# Patient Record
Sex: FEMALE | Race: WHITE
[De-identification: names, ages, dates, MRNs, and addresses within clinical notes are randomized per-mention and may not be internally consistent; named-entity substitution may affect disease eponyms.]

---

## 2018-03-13 ENCOUNTER — HOSPITAL ENCOUNTER (EMERGENCY)
Dept: HOSPITAL 63 - ER | Age: 48
Discharge: ANOTHER HOSPITAL | End: 2018-03-13
Payer: OTHER GOVERNMENT

## 2018-03-13 VITALS — BODY MASS INDEX: 30.61 KG/M2 | WEIGHT: 195 LBS | HEIGHT: 67 IN

## 2018-03-13 VITALS — DIASTOLIC BLOOD PRESSURE: 80 MMHG | SYSTOLIC BLOOD PRESSURE: 135 MMHG

## 2018-03-13 DIAGNOSIS — E87.6: ICD-10-CM

## 2018-03-13 DIAGNOSIS — B34.9: ICD-10-CM

## 2018-03-13 DIAGNOSIS — H53.8: ICD-10-CM

## 2018-03-13 DIAGNOSIS — M43.6: ICD-10-CM

## 2018-03-13 DIAGNOSIS — A08.4: ICD-10-CM

## 2018-03-13 DIAGNOSIS — G89.29: ICD-10-CM

## 2018-03-13 DIAGNOSIS — Z91.041: ICD-10-CM

## 2018-03-13 DIAGNOSIS — R51: Primary | ICD-10-CM

## 2018-03-13 DIAGNOSIS — E86.1: ICD-10-CM

## 2018-03-13 LAB
ALBUMIN SERPL-MCNC: 3.9 G/DL (ref 3.4–5)
ALBUMIN/GLOB SERPL: 0.8 {RATIO} (ref 1–1.7)
ALP SERPL-CCNC: 79 U/L (ref 46–116)
ALT SERPL-CCNC: 16 U/L (ref 14–59)
ANION GAP SERPL CALC-SCNC: 10 MMOL/L (ref 6–14)
APTT PPP: (no result) S
AST SERPL-CCNC: 12 U/L (ref 15–37)
BACTERIA #/AREA URNS HPF: 0 /HPF
BASOPHILS # BLD AUTO: 0 X10^3/UL (ref 0–0.2)
BASOPHILS NFR BLD: 1 % (ref 0–3)
BILIRUB SERPL-MCNC: 0.2 MG/DL (ref 0.2–1)
BILIRUB UR QL STRIP: (no result)
BUN/CREAT SERPL: 9 (ref 6–20)
CA-I SERPL ISE-MCNC: 9 MG/DL (ref 7–20)
CALCIUM SERPL-MCNC: 8.7 MG/DL (ref 8.5–10.1)
CHLORIDE SERPL-SCNC: 103 MMOL/L (ref 98–107)
CO2 SERPL-SCNC: 26 MMOL/L (ref 21–32)
CREAT SERPL-MCNC: 1 MG/DL (ref 0.6–1)
EOSINOPHIL NFR BLD: 0 % (ref 0–3)
EOSINOPHIL NFR BLD: 0 X10^3/UL (ref 0–0.7)
ERYTHROCYTE [DISTWIDTH] IN BLOOD BY AUTOMATED COUNT: 17 % (ref 11.5–14.5)
FIBRINOGEN PPP-MCNC: CLEAR MG/DL
GFR SERPLBLD BASED ON 1.73 SQ M-ARVRAT: 59.7 ML/MIN
GLOBULIN SER-MCNC: 4.6 G/DL (ref 2.2–3.8)
GLUCOSE SERPL-MCNC: 92 MG/DL (ref 70–99)
GLUCOSE UR STRIP-MCNC: (no result) MG/DL
HCT VFR BLD CALC: 39 % (ref 36–47)
HGB BLD-MCNC: 12.9 G/DL (ref 12–15.5)
INFLUENZA A PATIENT: NEGATIVE
INFLUENZA B PATIENT: NEGATIVE
LIPASE: 134 U/L (ref 73–393)
LYMPHOCYTES # BLD: 0.9 X10^3/UL (ref 1–4.8)
LYMPHOCYTES NFR BLD AUTO: 15 % (ref 24–48)
MCH RBC QN AUTO: 26 PG (ref 25–35)
MCHC RBC AUTO-ENTMCNC: 33 G/DL (ref 31–37)
MCV RBC AUTO: 79 FL (ref 79–100)
MONO #: 0.6 X10^3/UL (ref 0–1.1)
MONOCYTES NFR BLD: 11 % (ref 0–9)
NEUT #: 4.2 X10^3UL (ref 1.8–7.7)
NEUTROPHILS NFR BLD AUTO: 73 % (ref 31–73)
NITRITE UR QL STRIP: (no result)
PLATELET # BLD AUTO: 291 X10^3/UL (ref 140–400)
POTASSIUM SERPL-SCNC: 3.2 MMOL/L (ref 3.5–5.1)
PROT SERPL-MCNC: 8.5 G/DL (ref 6.4–8.2)
RBC # BLD AUTO: 4.94 X10^6/UL (ref 3.5–5.4)
RBC #/AREA URNS HPF: (no result) /HPF (ref 0–2)
SODIUM SERPL-SCNC: 139 MMOL/L (ref 136–145)
SP GR UR STRIP: <=1.005
SQUAMOUS #/AREA URNS LPF: (no result) /LPF
UROBILINOGEN UR-MCNC: 0.2 MG/DL
WBC # BLD AUTO: 5.8 X10^3/UL (ref 4–11)
WBC #/AREA URNS HPF: (no result) /HPF (ref 0–4)

## 2018-03-13 PROCEDURE — 85025 COMPLETE CBC W/AUTO DIFF WBC: CPT

## 2018-03-13 PROCEDURE — 71045 X-RAY EXAM CHEST 1 VIEW: CPT

## 2018-03-13 PROCEDURE — 80053 COMPREHEN METABOLIC PANEL: CPT

## 2018-03-13 PROCEDURE — 83690 ASSAY OF LIPASE: CPT

## 2018-03-13 PROCEDURE — 99285 EMERGENCY DEPT VISIT HI MDM: CPT

## 2018-03-13 PROCEDURE — 84484 ASSAY OF TROPONIN QUANT: CPT

## 2018-03-13 PROCEDURE — 87804 INFLUENZA ASSAY W/OPTIC: CPT

## 2018-03-13 PROCEDURE — 96361 HYDRATE IV INFUSION ADD-ON: CPT

## 2018-03-13 PROCEDURE — 81001 URINALYSIS AUTO W/SCOPE: CPT

## 2018-03-13 PROCEDURE — 86140 C-REACTIVE PROTEIN: CPT

## 2018-03-13 PROCEDURE — 36415 COLL VENOUS BLD VENIPUNCTURE: CPT

## 2018-03-13 PROCEDURE — 82550 ASSAY OF CK (CPK): CPT

## 2018-03-13 PROCEDURE — 70450 CT HEAD/BRAIN W/O DYE: CPT

## 2018-03-13 PROCEDURE — 96375 TX/PRO/DX INJ NEW DRUG ADDON: CPT

## 2018-03-13 PROCEDURE — 93005 ELECTROCARDIOGRAM TRACING: CPT

## 2018-03-13 PROCEDURE — 96374 THER/PROPH/DIAG INJ IV PUSH: CPT

## 2018-03-13 PROCEDURE — 87040 BLOOD CULTURE FOR BACTERIA: CPT

## 2018-03-13 PROCEDURE — 96372 THER/PROPH/DIAG INJ SC/IM: CPT

## 2018-03-13 PROCEDURE — 83605 ASSAY OF LACTIC ACID: CPT

## 2018-03-13 PROCEDURE — 96376 TX/PRO/DX INJ SAME DRUG ADON: CPT

## 2018-03-13 RX ADMIN — MORPHINE SULFATE PRN MG: 4 INJECTION, SOLUTION INTRAMUSCULAR; INTRAVENOUS at 11:26

## 2018-03-13 RX ADMIN — MORPHINE SULFATE PRN MG: 4 INJECTION, SOLUTION INTRAMUSCULAR; INTRAVENOUS at 12:55

## 2018-03-13 NOTE — RAD
PORTABLE CHEST 1V

 

History: Fever, cough, low potassium.

 

Comparison: None.

 

Findings:

Cardiomediastinal silhouette is within normal limits.

No focal airspace consolidation.

No pneumothorax identified.

No evidence of pleural effusion.

 

 

Impression:  No radiographic evidence of active airspace consolidation. 

 

Electronically signed by: Manpreet Escalera MD (3/13/2018 11:55 AM) [facility]

## 2018-03-13 NOTE — PHYS DOC
General


Chief Complaint:  MULTIPLE COMPLAINTS


Stated Complaint:  MULTIPLE COMPLAINTS


Time Seen by MD:  11:02


Source:  patient


Exam Limitations:  other (vague historian)


Problems:  





History of Present Illness


Initial Comments


48-year-old female sent to the emergency department from [place] for headache 

and vision changes





Patient states that for the past 4-5 nights she "just hasn't been feeling well" 

and unable to sleep.  morning (2 days ago) she developed nasal congestion 

with a dry cough and had multiple episodes of loose watery stools and retching 

no emesis or focal abdominal pain which lasted throughout that day. By Monday 

morning GI symptoms had resolved but the cough and congestion persisted. 

Additionally, on Monday morning while taking a shower patient began seeing 

flashing lights in her left eye, severe left cephalgia, and concomitant 

perceived darkened/blurred vision and "tunnel vision." The headache and vision 

changes lasted overnight and the patient went to [place] this afternoon for 

evaluation.





On arrival to this emergency department patient was complaining of severe left 

sided head and body pain (throbbing/pressure) worse with bright light no known  

relieving factors. She was found to have left-sided trapezius and 

sternocleidomastoid muscle hypertonicity (chronic she states from prior left 

clavicle/sternum fracture) which somewhat decreased cervical rotatory range of 

motion but no actual nuchal rigidity noted. She denies fever, chills, 

diaphoresis, nausea, and myalgias no new lateralizing neurologic deficits other 

than the vision change.  She was very fearful/anxious on arrival 

hyperventilating initially.  I observed her to ambulate to the exam room 

unassisted and with normal gait, she was freely and without pain moving her 

head all directions looking around without pained expression or stiffness/

rigidity.





ED VS:  100.4, 129, 18, 153/102, 96% RA





Sepsis workup initiated, patient vaguely notified me of previously diagnosed 

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 

with yearly MR evaluations, she could not provide any other information about 

this.





Due to new focal neurologic deficit and previously diagnosed intracranial mass 

LP contraindicated due to possibility of herniation.


Timing/Duration:  other (3 days)


Severity:  severe


Modifying Factors:  improves with other


Associated Symptoms:  headaches, other


Allergies:  


Coded Allergies:  


     Iodinated Contrast- Oral and IV Dye (Verified  Allergy, Severe, 3/13/18)





Past Medical History


Medical History:  migraines, other (left clavicle/sternum fracture, chronic 

left hip/shoulder pain, "intracranial cyst")


Surgical History:  other (ORIF left wrist)





Social History


Smoker:  non-smoker


Alcohol:  occasionally


Drugs:  none





Review of Systems


Constitutional:  denies chills, denies diaphoresis, denies fever, malaise


EENTM:  see HPI, denies ear pain, denies ear discharge, denies nose pain, nose 

congestion, denies throat pain


Respiratory:  denies cough, denies shortness of breath, denies wheezing


Cardiovascular:  denies chest pain, denies palpitations, denies syncope


Gastrointestinal:  denies abdominal pain, diarrhea, denies nausea, denies 

vomiting


Genitourinary:  denies dysuria, denies frequency, denies hematuria


Musculoskeletal:  see HPI, denies back pain, denies joint swelling


Psychiatric/Neurological:  see HPI, denies numbness, denies paresthesia


Hematologic/Lymphatic:  denies blood clots, denies easy bleeding, denies easy 

bruising





Physical Exam


General Appearance:  moderate distress


Eyes:  left eye other (peripheral vision loss on confrontational testing), 

bilateral eye normal inspection, bilateral eye PERRL, bilateral eye EOMI


Ear, Nose, Throat:  hearing grossly normal, normal ENT inspection, normal 

pharynx (very dry mucus membranes)


Neck:  supple (left SCM/trapezius/scalene hypertonicity with tenderness and 

decreased R rotation/sidebending, neg kernig/brudzinski, no midline or bony TTP)


Respiratory:  normal breath sounds, no respiratory distress


Cardiovascular:  normal peripheral pulses, tachycardia (initially, HR 

normalized as pt relaxed)


Gastrointestinal:  normal bowel sounds, non tender, soft


Back:  no vertebral tenderness, CVA tenderness (L)


Extremities:  normal range of motion, non-tender, no pedal edema, no calf 

tenderness, pelvis stable


Neurologic/Psychiatric:  CNs II-XII nml as tested, no motor/sensory deficits, 

alert, normal mood/affect, oriented x 3


Skin:  normal color, warm/dry





Orders, Labs, Meds


EKG: Sinus tachycardia 115 bpm, no ST segment elevation interpreted by me





K+ 3.2 (20meq given PO, CRP 37.4, otherwise reassuring labs including CBC/

lactic acid.





PATIENT: VALENCIA LOPEZ ACCOUNT: [id number] MRN#: [id number]


: 1970 LOCATION: ER AGE: 48


SEX: F EXAM DT: 18 ACCESSION#: [id number]


STATUS: REG ER ORD. PHYSICIAN: DOREEN HUNG DO 


REASON: fever cough


PROCEDURE: PORTABLE CHEST 1V





PORTABLE CHEST 1V


 


History: Fever, cough, low potassium.


 


Comparison: None.


 


Findings:


Cardiomediastinal silhouette is within normal limits.


No focal airspace consolidation.


No pneumothorax identified.


No evidence of pleural effusion.


 


 


Impression:  No radiographic evidence of active airspace consolidation. 


 


Electronically signed by: Manpreet Escalera MD (3/13/2018 11:55 AM) [facility]














DICTATED AND SIGNED BY:     MANPREET ESCALERA MD


DATE:     18 1154





CC: KRISTALSKAYLYNN; DOREEN HUNG DO ~











1213: I rechecked the patient, heart rate has improved to the mid 90s, blood 

pressure has come down to 150/74. Patient's color is improving, her headache is 

much better and she is overall improving. No urge to urinate after 1 L, another 

bolus ordered.





1310:  Patient rechecked, she is now sitting up and appears to be much more 

comfortable.  VS are normal, and despite IV fluids, morphine, and dilaudid 

reports her headache remains unchanged.








PATIENT: VALENCIA LOPEZ ACCOUNT: [id number] MRN#: [id number]


: 1970 LOCATION: ER AGE: 48


SEX: F EXAM DT: 18 ACCESSION#: [id number]


STATUS: REG ER ORD. PHYSICIAN: DOREEN HUNG DO 


REASON: headache, fever


PROCEDURE: CT HEAD WO CONTRAST





PQRS Compliance Statement:


 


One or more of the following individualized dose reduction techniques were


utilized for this examination:  


1. Automated exposure control  


2. Adjustment of the mA and/or kV according to patient size  


3. Use of iterative reconstruction technique


 


 


CT HEAD WITHOUT CONTRAST


 


History: fever and headache since , positive influenza 


 


Comparison: None.


 


Procedure: Axial images are obtained of the head from the skull base 


through the vertex without IV contrast.


 


Findings: 


The ventricles and sulci are normal for the patient's age. 


No mass-effect, midline shift, hemorrhage, extra-axial fluid collection, 


or obvious acute infarction is identified.  Basilar cisterns are patent.  


 


Bone windows demonstrate no acute calvarial abnormality. 


 


The visualized paranasal sinuses are clear. Mastoid air cells are well 


aerated. 


 


IMPRESSION:


No acute intracranial abnormality.


 


Electronically signed by: Addy White MD (3/13/2018 2:09 PM) [id number]














DICTATED AND SIGNED BY:     ADDY WHITE MD


DATE:     18 1408





CC: SAUNDRAALVAROSKAYLYNN R; DOREEN HUNG DO ~





Visual acuity:  20/40 R, 20/40 L, 20/40 b/l with corrective lenses








Throughout extended ED course patient received 2+ liters NS IV, toradol 30mg IV

, serial 4mg morphine dosing, 1mg dilaudid, and imitrex SC.  After initially 

stating she received 50% pain relief with first 4mg morphine IV she stated (I 

rechecked her hourly at minimum) the pain returned fully and no subsequent meds 

provided any relief.  


Patient also received rocephin 2mg and dexamethasone 10mg IV although 

meningitis very unlikely with left sided hemiplegia only and no nuchal rigidity.





Concern for possible left retinal detachment with flashing lights left eye, 

blurred vision, peripheral field loss.  No retinal ophthalmologic specialists 

at [facility], patient will need transfer to .  DDX includes not limited to:  

atypical CNS infection, vascular lesion, retinal detachment, atypical migraine, 

torticollis, cyst complication, viral syndrome





I contacted Transfer at , patient accepted to [facility] bed by Dr Shania Armando 

for further evaluation and treatment.  Her assistance appreciated.





IMPRESSIONS:


Left cephalgia NOS


Vision change left eye r/o detachment vs other


Hypokalemia


Hypovolemia


Torticollis left sided


Viral syndrome NOS


Improved viral gastroenteritis


Chronic Pain


Departure


Time of Disposition:  17:24


Disposition:  02 XFER [facility]-TRM HOSP


Condition:  IMPROVED





Additional Instructions:  


EMS transfer to  for [facility] admission Dr Armando accepting.











DOREEN HUNG DO Mar 13, 2018 11:07

## 2018-03-13 NOTE — EKG
[facility] [address], [place] [address]

Test Date:    2018               Test Time:    11:14:02

Pat Name:     VALENCIA LOPEZ           Department:   

Patient ID:   [id number]           Room:          

Gender:       F                        Technician:   GRIS

:          1970               Requested By: DOREEN HUNG

Order Number: [id number]            Reading MD:     

                                 Measurements

Intervals                              Axis          

Rate:         115                      P:            43

WY:           142                      QRS:          12

QRSD:         66                       T:            33

QT:           306                                    

QTc:          425                                    

                           Interpretive Statements

SINUS TACHYCARDIA

QRS(T) CONTOUR ABNORMALITY

CONSIDER ANTEROSEPTAL MYOCARDIAL DAMAGE

POSSIBLY ABNORMAL ECG

RI6.01

No previous ECG available for comparison

## 2018-03-13 NOTE — RAD
PQRS Compliance Statement:

 

One or more of the following individualized dose reduction techniques were

utilized for this examination:  

1. Automated exposure control  

2. Adjustment of the mA and/or kV according to patient size  

3. Use of iterative reconstruction technique

 

 

CT HEAD WITHOUT CONTRAST

 

History: fever and headache since sunday, positive influenza 

 

Comparison: None.

 

Procedure: Axial images are obtained of the head from the skull base 

through the vertex without IV contrast.

 

Findings: 

The ventricles and sulci are normal for the patient's age. 

No mass-effect, midline shift, hemorrhage, extra-axial fluid collection, 

or obvious acute infarction is identified.  Basilar cisterns are patent.  

 

Bone windows demonstrate no acute calvarial abnormality. 

 

The visualized paranasal sinuses are clear. Mastoid air cells are well 

aerated. 

 

IMPRESSION:

No acute intracranial abnormality.

 

Electronically signed by: Addy White MD (3/13/2018 2:09 PM) [id number]

## 2020-12-30 ENCOUNTER — HOSPITAL ENCOUNTER (EMERGENCY)
Dept: HOSPITAL 63 - ER | Age: 50
Discharge: HOME | End: 2020-12-30
Payer: OTHER GOVERNMENT

## 2020-12-30 VITALS — SYSTOLIC BLOOD PRESSURE: 172 MMHG | DIASTOLIC BLOOD PRESSURE: 107 MMHG

## 2020-12-30 VITALS — HEIGHT: 67 IN | BODY MASS INDEX: 41.52 KG/M2 | WEIGHT: 264.55 LBS
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
## 2020-12-30 DIAGNOSIS — J44.9: ICD-10-CM
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
## 2020-12-30 DIAGNOSIS — Z91.041: ICD-10-CM

## 2020-12-30 DIAGNOSIS — Z88.5: ICD-10-CM
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
## 2020-12-30 DIAGNOSIS — R20.0: ICD-10-CM

## 2020-12-30 DIAGNOSIS — E03.9: ICD-10-CM

## 2020-12-30 DIAGNOSIS — M25.561: Primary | ICD-10-CM

## 2020-12-30 DIAGNOSIS — Z88.8: ICD-10-CM

## 2020-12-30 PROCEDURE — 73562 X-RAY EXAM OF KNEE 3: CPT

## 2020-12-30 PROCEDURE — 99284 EMERGENCY DEPT VISIT MOD MDM: CPT

## 2020-12-30 NOTE — RAD
EXAM: 3 views right knee



DATE: 12/30/2020 7:31 PM



INDICATION: Reason: RIGHT KNEE PAIN, FALL / Spl. Instructions:  / History:  



COMPARISON: No Prior



FINDINGS:

Trace right knee joint fluid likely physiologic. No evidence of acute fracture or dislocation. Joint 
spaces are preserved without significant degenerative/proliferative change.



IMPRESSION:

No evidence of acute fracture or dislocation.



Electronically signed by: Puneet Roman MD (12/30/2020 8:49 PM) [facility]

## 2020-12-30 NOTE — PHYS DOC
Past History


Past Medical History:  COPD, Hypothyroid


Past Surgical History:  


Alcohol Use:  None


Drug Use:  None





General Adult


EDM:


Chief Complaint:  KNEE INJURY





HPI:


HPI:





Patient is a 50-year-old female presents with right knee pain.  Patient states 

on [place] Kim she stepped off one of her steps and twisted her knee.  Patient

reports tingling, numbness.  Patient was okay this morning, but  had her 

pick her up from work, and carry her to the vehicle because she could not put 

weight on her right leg.  Patient reports taking ibuprofen today with little 

relief.  Pedal pulses are strong.  No swelling noted.





Review of Systems:


Review of Systems:


Constitutional:  Denies fever or chills 


Eyes:  Denies change in visual acuity 


HENT:  Denies nasal congestion or sore throat 


Respiratory:  Denies cough or shortness of breath 


Cardiovascular:  Denies chest pain or edema 


GI:  Denies abdominal pain, nausea, vomiting, bloody stools or diarrhea 


: Denies dysuria 


Musculoskeletal:  Denies back pain or joint pain 


Integument:  Denies rash 


Neurologic:  Denies headache, focal weakness or sensory changes 


Endocrine:  Denies polyuria or polydipsia 


Lymphatic:  Denies swollen glands 


Psychiatric:  Denies depression or anxiety





Allergies:


Allergies:





Allergies








Coded Allergies Type Severity Reaction Last Updated Verified


 


  Iodinated Contrast Media Allergy Severe  3/13/18 Yes


 


  chloral hydrate Allergy Unknown  20 Yes


 


  codeine Allergy Unknown  20 Yes











Physical Exam:


PE:





Constitutional: Well developed, well nourished, no acute distress, non-toxic 

appearance. []


HENT: Normocephalic, atraumatic, bilateral external ears normal, oropharynx 

moist, no oral exudates, nose normal. []


Eyes: PERRLA, EOMI, conjunctiva normal, no discharge. [] 


Neck: Normal range of motion, no tenderness, supple, no stridor. [] 


Cardiovascular:Heart rate regular rhythm, no murmur []


Lungs & Thorax:  Bilateral breath sounds clear to auscultation []


Abdomen: Bowel sounds normal, soft, no tenderness, no masses, no pulsatile 

masses. [] 


Skin: Warm, dry, no erythema, no rash. [] 


Back: No tenderness, no CVA tenderness. [] 


Extremities:  tenderness to right knee, no cyanosis, no clubbing, unable to bend

 right knee, no swelling noted


Neurologic: Alert and oriented X 3, normal motor function, normal sensory 

function, no focal deficits noted. []


Psychologic: Affect normal, judgement normal, mood normal. []





Current Patient Data:


Vital Signs:





                                   Vital Signs








  Date Time  Temp Pulse Resp B/P (MAP) Pulse Ox O2 Delivery O2 Flow Rate FiO2


 


20 19:08 97.9 98 16 172/107 (128) 99 Room Air  











EKG:


EKG:


[]





Radiology/Procedures:


Radiology/Procedures:


[]EXAM: 3 views right knee





DATE: 2020 7:31 PM





INDICATION: Reason: RIGHT KNEE PAIN, FALL / Spl. Instructions:  / History:  





COMPARISON: No Prior





FINDINGS:


Trace right knee joint fluid likely physiologic. No evidence of acute fracture 

or dislocation. Joint spaces are preserved without significant 

degenerative/proliferative change.





IMPRESSION:


No evidence of acute fracture or dislocation.





Electronically signed by: Puneet Roman MD (2020 8:49 PM) [facility]





Heart Score:


Risk Factors:


Risk Factors:  DM, Current or recent (<one month) smoker, HTN, HLP, family histo

ry of CAD, obesity.


Risk Scores:


Score 0 - 3:  2.5% MACE over next 6 weeks - Discharge Home


Score 4 - 6:  20.3% MACE over next 6 weeks - Admit for Clinical Observation


Score 7 - 10:  72.7% MACE over next 6 weeks - Early Invasive Strategies





Course & Med Decision Making:


Course & Med Decision Making


Pertinent Labs and Imaging studies reviewed. (See chart for details)





[] 42-year-old female presents with right knee pain patient stepped off a step 

and twisted her knee.  We will order right knee x-ray.





Knee xray shows No evidence of acute fracture or dislocation.





Will discharge with a knee brace and F/U with PCP for further evaluation.





Dragon Disclaimer:


Dragon Disclaimer:


This electronic medical record was generated, in whole or in part, using a voice

 recognition dictation system.





Departure


Departure:


Impression:  


   Primary Impression:  


   Knee pain, right


   Qualified Codes:  M25.561 - Pain in right knee


Disposition:  01 DC HOME SELF CARE/HOMELESS


Condition:  STABLE


Referrals:  


NON,STAFF (PCP)


Patient Instructions:  Knee Pain, Easy-to-Read





Additional Instructions:  





Knee brace has been provided to help with the pain.  Continue taking ibuprofen 

and Tylenol as needed for discomfort.  Please make an appointment with your PCP 

for further evaluation and possible MRI.





EMERGENCY DEPARTMENT GENERAL DISCHARGE INSTRUCTIONS





Thank you for coming to [facility] (ED) today and trusting us

 with you 


care.  We trust that you had a positivie experience in our Emergency Department.

  If you 


wish to speak to the department management, you may call the director at 

[phone].





YOUR FOLLOW UP INSTRUCTIONS ARE AS FOLLOWS:





1.  Do you have a private Doctor?  If you do not have a private doctor, please 

ask for a 


resource list of physicians or clinics that may be able to assist you with 

follow up care.





2.  The Emergency Physician has interpreted your x-rays.  The X-Ray specialist 

will also 


review them.  If there is a change in the findings, you will be notified in 48 

hours when at 


all possible.





3.  A lab test or culture has been done, your results will be reviewed and you 

will be 


notified if you need a change in treatment.





ADDITIONAL INSTRUCTIONS AND INFORMATION:





1.  Your care today has been supervised by a physician who is specially trained 

in emergency 


care.  Many problems require more than one evaluation for a complete diagnosis 

and 


treatment.  We recommend that you schedule your follow up appointment as 

recommended to 


ensure complete treatment of you illness or injury.  If you are unable to obtain

 follow up 


care and continue to have a problem, or if your condition worsens, we recommend 

that you 


return to the ED.





2.  We are not able to safely determine your condition over the phone nor are we

 able to 


give sound medical advice over the phone.  For these safety reasons, if you call

 for medical 


advice we will ask you to come to the ED for further evaluation.





3.  If you have any questions regarding these discharge instructions please call

 the ED at 


[phone].





SAFETY INFORMATION:





In the interest of safety, wellness, and injury prevention; we encourage you to 

wear your 


sealbelt, if you smoke; quite smoking, and we encourage family to use a 

protective helmet 


for bicycling and other sporting events that present an increased risk for head 

injury.





IF YOUR SYMPTOMS WORSEN OR NEW SYMPTOMS DEVELOP, OR YOU HAVE CONCERNS ABOUT YOUR

 CONDITION; 


OR IF YOUR CONDITION WORSENS WHILE YOU ARE WAITING FOR YOUR FOLLOW UP 

APPOINTMENT; EITHER 


CONTACT YOUR PRIMARY CARE DOCTOR, THE PHYSICIAN WHOSE NAME AND NUMBER YOU WERE 

GIVEN, OR 


RETURN TO THE ED IMMEDIATELY.


Scripts


Hydrocodone Bit/Acetaminophen (HYDROCODONE-APAP 5-325  **) 1 Each Tablet


1 TAB PO PRN Q6HRS PRN for PAIN for 3 Days, #12 TAB 0 Refills


   Prov: EUGENIE LIANG         20











EUGENIE LIANG               Dec 30, 2020 20:19